# Patient Record
Sex: MALE | Employment: OTHER | URBAN - METROPOLITAN AREA
[De-identification: names, ages, dates, MRNs, and addresses within clinical notes are randomized per-mention and may not be internally consistent; named-entity substitution may affect disease eponyms.]

---

## 2022-05-27 VITALS — HEIGHT: 65 IN | BODY MASS INDEX: 28.32 KG/M2 | WEIGHT: 170 LBS

## 2022-05-27 DIAGNOSIS — Z12.11 SCREENING FOR COLON CANCER: ICD-10-CM

## 2022-05-27 DIAGNOSIS — Z86.010 HISTORY OF COLON POLYPS: Primary | ICD-10-CM

## 2022-05-27 RX ORDER — PRAVASTATIN SODIUM 20 MG/1
TABLET ORAL
COMMUNITY
Start: 2022-03-25 | End: 2022-05-27

## 2022-05-27 RX ORDER — HYDROCHLOROTHIAZIDE 12.5 MG/1
TABLET ORAL
COMMUNITY
Start: 2022-03-25

## 2022-05-27 RX ORDER — AMLODIPINE AND OLMESARTAN MEDOXOMIL 10; 40 MG/1; MG/1
TABLET ORAL
COMMUNITY
Start: 2022-04-04

## 2022-05-27 NOTE — TELEPHONE ENCOUNTER
----- Message from Neelima Anderson MA sent at 5/26/2022  3:32 PM CDT -----    ----- Message -----  From: Nohemy Lambert  Sent: 5/26/2022   3:28 PM CDT  To: [facility] Staff    Spencer Pandey is calling to schedule his colon recheck per the letter he received in the mail. Please give him a call back at [phone] (yguv)

## 2022-06-03 ENCOUNTER — TELEPHONE (OUTPATIENT)
Dept: GASTROENTEROLOGY | Facility: CLINIC | Age: 62
End: 2022-06-03
Payer: COMMERCIAL

## 2022-06-03 RX ORDER — SOD SULF/POT CHLORIDE/MAG SULF 1.479 G
12 TABLET ORAL DAILY
Qty: 24 TABLET | Refills: 0 | Status: SHIPPED | OUTPATIENT
Start: 2022-06-03

## 2022-06-03 NOTE — TELEPHONE ENCOUNTER
"[facility]  [address] [place] [address]  Phone: [phone]  Fax: [phone]    History & Physical         Provider: Dr. Chacha Ramos    Patient Name: Spencer BARNES (age):1960  61 y.o.           Gender: male   Phone: [phone]     Referring Physician: Navid Mendes     Vital Signs:   Height - 5'5"  Weight - 170 lb  BMI -  28.29    Plan: History of colon polyps  Screening for colon cancer    No diagnosis found.        History:      Past Medical History:   Diagnosis Date    Essential (primary) hypertension     High cholesterol       Past Surgical History:   Procedure Laterality Date    COLONOSCOPY  2019    MEDIAL COLLATERAL LIGAMENT REPAIR, KNEE        Medication List with Changes/Refills   Current Medications    AMLODIPINE-OLMESARTAN (SUE) 10-40 MG PER TABLET        HYDROCHLOROTHIAZIDE (HYDRODIURIL) 12.5 MG TAB        SOD SULF-POT CHLORIDE-MAG SULF (SUTAB) 1.479-0.188- 0.225 GRAM TABLET    Take 12 tablets by mouth once daily. Take according to package instructions with indicated amount of water. No breakfast day before test.      Review of patient's allergies indicates:   Allergen Reactions    Ace inhibitors      Ones that end in Pril      No family history on file.   Social History     Tobacco Use    Smoking status: Former Smoker    Smokeless tobacco: Never Used   Substance Use Topics    Alcohol use: Yes     Comment: beer socially    Drug use: Never        Physical Examination:     General Appearance:___________________________  HEENT: " _____________________________________  Abdomen:____________________________________  Heart:________________________________________  Lungs:_______________________________________  Extremities:___________________________________  Skin:_________________________________________  Endocrine:____________________________________  Genitourinary:_________________________________  Neurological:__________________________________      Patient has been evaluated immediately prior to sedation and is medically cleared for endoscopy with IVCS as an ASA class: ______      Physician Signature: _________________________       Date: ________  Time: ________

## 2022-08-15 ENCOUNTER — TELEPHONE (OUTPATIENT)
Dept: GASTROENTEROLOGY | Facility: CLINIC | Age: 62
End: 2022-08-15
Payer: COMMERCIAL

## 2022-08-15 NOTE — TELEPHONE ENCOUNTER
I spoke with the patient who was given amoxicillin by  for an allergic reaction.  After speaking with Dr. Ramos, I let him know that he could proceed as long as symptoms did not develop.  KDL, CMA

## 2022-08-15 NOTE — TELEPHONE ENCOUNTER
----- Message from Donita Santamaria sent at 8/15/2022 11:13 AM CDT -----  Contact: PT    ----- Message -----  From: Tonia Jewell MA  Sent: 8/15/2022  10:49 AM CDT  To: Donita Santamaria    Wrong office  ----- Message -----  From: Donita Santamaria  Sent: 8/15/2022   9:36 AM CDT  To: [facility] Clinical Support Staff    .Type:  Needs Medical Advice    Who Called:  Spencer   Regarding: can pt still have procedure/ on antibiotics     Would the patient rather a call back or a response via [facility]?  Callback   Best Call Back Number:  [phone] (home)      Additional Information:  pt is currently taking amoxicillin for the next 5 days

## 2022-08-18 ENCOUNTER — OUTSIDE PLACE OF SERVICE (OUTPATIENT)
Dept: GASTROENTEROLOGY | Facility: CLINIC | Age: 62
End: 2022-08-18
Payer: COMMERCIAL

## 2022-08-18 LAB — CRC RECOMMENDATION EXT: NORMAL

## 2022-08-18 PROCEDURE — 45385 COLONOSCOPY W/LESION REMOVAL: CPT | Mod: 33,,, | Performed by: INTERNAL MEDICINE

## 2022-08-18 PROCEDURE — 45385 PR COLONOSCOPY,REMV LESN,SNARE: ICD-10-PCS | Mod: 33,,, | Performed by: INTERNAL MEDICINE

## 2022-08-18 PROCEDURE — 45380 COLONOSCOPY AND BIOPSY: CPT | Mod: 59,,, | Performed by: INTERNAL MEDICINE

## 2022-08-18 PROCEDURE — 45380 PR COLONOSCOPY,BIOPSY: ICD-10-PCS | Mod: 59,,, | Performed by: INTERNAL MEDICINE

## 2022-08-25 ENCOUNTER — TELEPHONE (OUTPATIENT)
Dept: GASTROENTEROLOGY | Facility: CLINIC | Age: 62
End: 2022-08-25
Payer: COMMERCIAL

## 2022-12-08 ENCOUNTER — DOCUMENTATION ONLY (OUTPATIENT)
Dept: GASTROENTEROLOGY | Facility: CLINIC | Age: 62
End: 2022-12-08
Payer: COMMERCIAL